# Patient Record
Sex: FEMALE | URBAN - METROPOLITAN AREA
[De-identification: names, ages, dates, MRNs, and addresses within clinical notes are randomized per-mention and may not be internally consistent; named-entity substitution may affect disease eponyms.]

---

## 2020-05-18 ENCOUNTER — OFFICE VISIT (OUTPATIENT)
Dept: URBAN - METROPOLITAN AREA CLINIC 17 | Facility: CLINIC | Age: 75
End: 2020-05-18
Payer: MEDICARE

## 2020-05-18 DIAGNOSIS — H25.13 AGE-RELATED NUCLEAR CATARACT, BILATERAL: ICD-10-CM

## 2020-05-18 DIAGNOSIS — H04.123 DRY EYE SYNDROME OF BILATERAL LACRIMAL GLANDS: ICD-10-CM

## 2020-05-18 DIAGNOSIS — G43.819: Primary | ICD-10-CM

## 2020-05-18 PROCEDURE — 92004 COMPRE OPH EXAM NEW PT 1/>: CPT | Performed by: OPTOMETRIST

## 2020-05-18 RX ORDER — DOXYCYCLINE HYCLATE 50 MG/1
50 MG CAPSULE, GELATIN COATED ORAL
Qty: 0 | Refills: 0 | Status: ACTIVE
Start: 2020-05-18

## 2020-05-18 RX ORDER — METHYLPREDNISOLONE 2 MG/1
2 MG TABLET ORAL
Qty: 0 | Refills: 0 | Status: ACTIVE
Start: 2020-05-18

## 2020-05-18 RX ORDER — SITAGLIPTIN AND METFORMIN HYDROCHLORIDE 50; 1000 MG/1; MG/1
TABLET, FILM COATED ORAL
Qty: 0 | Refills: 0 | Status: ACTIVE
Start: 2020-05-18

## 2020-05-18 RX ORDER — GLIPIZIDE 5 MG/1
5 MG TABLET ORAL
Qty: 0 | Refills: 0 | Status: ACTIVE
Start: 2020-05-18

## 2020-05-18 ASSESSMENT — INTRAOCULAR PRESSURE
OS: 20
OD: 21

## 2020-05-18 NOTE — IMPRESSION/PLAN
Impression: Other intractable migraine w/o status migrainosus: G43.819. Plan: Discussed diagnosis in detail with patient. Discussed treatment options with patient. Reassured patient of current condition and treatment. Will continue to observe condition and or symptoms.  Advised patient if she keeps experiencing migraines to see a neurologist.

## 2020-06-05 ENCOUNTER — TESTING ONLY (OUTPATIENT)
Dept: URBAN - METROPOLITAN AREA CLINIC 17 | Facility: CLINIC | Age: 75
End: 2020-06-05

## 2020-06-05 DIAGNOSIS — H52.03 HYPERMETROPIA, BILATERAL: Primary | ICD-10-CM

## 2020-06-05 ASSESSMENT — VISUAL ACUITY
OD: 20/30
OS: 20/25

## 2020-06-05 ASSESSMENT — KERATOMETRY
OS: 42.25
OD: 42.13

## 2020-06-19 ENCOUNTER — TESTING ONLY (OUTPATIENT)
Dept: URBAN - METROPOLITAN AREA CLINIC 17 | Facility: CLINIC | Age: 75
End: 2020-06-19
Payer: MEDICARE

## 2020-06-19 PROCEDURE — 92083 EXTENDED VISUAL FIELD XM: CPT | Performed by: OPTOMETRIST

## 2020-06-22 ENCOUNTER — OFFICE VISIT (OUTPATIENT)
Dept: URBAN - METROPOLITAN AREA CLINIC 17 | Facility: CLINIC | Age: 75
End: 2020-06-22
Payer: MEDICARE

## 2020-06-22 DIAGNOSIS — H53.453 OTHER LOCALIZED VISUAL FIELD DEFECT, BILATERAL: Primary | ICD-10-CM

## 2020-06-22 PROCEDURE — 92083 EXTENDED VISUAL FIELD XM: CPT | Performed by: OPTOMETRIST

## 2020-06-22 PROCEDURE — 99213 OFFICE O/P EST LOW 20 MIN: CPT | Performed by: OPTOMETRIST

## 2020-06-22 ASSESSMENT — INTRAOCULAR PRESSURE
OD: 16
OS: 21

## 2020-06-22 ASSESSMENT — KERATOMETRY
OD: 42.00
OS: 42.38

## 2020-06-22 NOTE — IMPRESSION/PLAN
Impression: Other localized visual field defect, bilateral: H53.453. Plan: Discussed diagnosis in detail with patient. Discussed treatment options with patient. Reassured patient of current condition and treatment. Will continue to observe condition and or symptoms. Recommended patient to see Neurologist. VF was performed and reviewed we scanned Vf in and sent patient with a copy.